# Patient Record
Sex: MALE | Race: OTHER | Employment: FULL TIME | URBAN - METROPOLITAN AREA
[De-identification: names, ages, dates, MRNs, and addresses within clinical notes are randomized per-mention and may not be internally consistent; named-entity substitution may affect disease eponyms.]

---

## 2022-05-01 ENCOUNTER — MED REC SCAN ONLY (OUTPATIENT)
Dept: ORTHOPEDICS CLINIC | Facility: CLINIC | Age: 32
End: 2022-05-01

## 2022-05-16 ENCOUNTER — HOSPITAL ENCOUNTER (OUTPATIENT)
Dept: MRI IMAGING | Age: 32
Discharge: HOME | End: 2022-05-16
Attending: ORTHOPAEDIC SURGERY
Payer: COMMERCIAL

## 2022-05-16 ENCOUNTER — TELEPHONE (OUTPATIENT)
Dept: ORTHOPEDICS CLINIC | Facility: CLINIC | Age: 32
End: 2022-05-16

## 2022-05-16 ENCOUNTER — OFFICE VISIT (OUTPATIENT)
Dept: ORTHOPEDICS CLINIC | Facility: CLINIC | Age: 32
End: 2022-05-16
Payer: COMMERCIAL

## 2022-05-16 DIAGNOSIS — S86.812A RUPTURE OF LEFT PATELLAR TENDON, INITIAL ENCOUNTER: Primary | ICD-10-CM

## 2022-05-16 DIAGNOSIS — S86.812A RUPTURE PATELLAR TENDON, LEFT, INITIAL ENCOUNTER: Primary | ICD-10-CM

## 2022-05-16 DIAGNOSIS — S86.812A RUPTURE OF LEFT PATELLAR TENDON, INITIAL ENCOUNTER: ICD-10-CM

## 2022-05-16 PROCEDURE — 99205 OFFICE O/P NEW HI 60 MIN: CPT | Performed by: ORTHOPAEDIC SURGERY

## 2022-05-16 PROCEDURE — 73721 MRI JNT OF LWR EXTRE W/O DYE: CPT | Performed by: ORTHOPAEDIC SURGERY

## 2022-05-16 RX ORDER — ZINC GLUCONATE 50 MG
TABLET ORAL AS DIRECTED
COMMUNITY

## 2022-05-16 RX ORDER — HYDROCODONE BITARTRATE AND ACETAMINOPHEN 5; 325 MG/1; MG/1
1 TABLET ORAL
COMMUNITY
Start: 2022-05-13

## 2022-05-16 NOTE — PROGRESS NOTES
OR BOOKING SHEET KNEE  Name: Bibi Merino  MRN: [id number]   : 1990  Diagnosis:  [x] Rupture of left patellar tendon, initial encounter [K18.708N]  Disposition:    [x] Ambulatory  [] Overnight for AGNES  [] Overnight for observation and pain control  [] Inpatient procedure    Operative Time Required: 2 hours  Procedure:  Antibiotics: 2 g cefazolin within 60 minutes of surgical incision (3 g if > 120 kg)  Laterality: [] RIGHT  [x] LEFT                       [] BILATERAL  Procedures:   [x] Arthroscopy  [] Arthrotomy (81795)  [] Arthroscopy/Arthrotomy     [x] Patella Tendon Repair (75612)     [x] Synovectomy, 2 or more compartments (24038)   [] Removal of hardware (51198)   [] I & D (51 [phone] 20 [phone])   [] NEW (26978)   [] Resection of infrapatellar branch of saphenous nerve (53919)                    [] PatelloFemoral Joint Replacement (54549)    Injections:   [x] Stem cells from bone marrow aspiration (13527)    From:  [x] Left Iliac crest  [] Right Iliac crest    To:  [x] Left knee   [] Right knee  [] Other     Additional info:   [] PCP Clearance Needed  [] C - arm  [x] TXA at Time of Surgery  [x] Physical Therapy External Referral  [x] DME Rx Needed  Implants needed: Arthrex  Positioning Equipment: Supine with Lateral Post   Assistant request: Dominique Méndez

## 2022-05-16 NOTE — TELEPHONE ENCOUNTER
Surgeon: Dr. Hernandez Board    Date of Surgery: 5/19/22     Post Op Appt: 5/27/22     Facility: [facility]    Inpatient or Outpatient: Outpatient    Surgical Assistant: yes    Preadmission Testing Ordered:  yes    Pre-Op Clearance Requested:   PCP: n/a   Cardiac: n/a   Pulmonary: n/a   Dental: n/a   Other: n/a    DME: yes    Rehab Services Ordered:   Home Health: n/a   Outpatient: yes    Is this work comp related? no    Prior Authorization: pending    Disability Paperwork: no    On [place] Calendar: yes    Notes:

## 2022-05-18 RX ORDER — ONDANSETRON 4 MG/1
4 TABLET, ORALLY DISINTEGRATING ORAL EVERY 8 HOURS PRN
Qty: 8 TABLET | Refills: 0 | Status: SHIPPED | OUTPATIENT
Start: 2022-05-18 | End: 2022-05-27

## 2022-05-18 RX ORDER — TRAMADOL HYDROCHLORIDE 50 MG/1
TABLET ORAL
Qty: 20 TABLET | Refills: 1 | Status: SHIPPED | OUTPATIENT
Start: 2022-05-18 | End: 2022-05-27

## 2022-05-19 ENCOUNTER — ANESTHESIA EVENT (OUTPATIENT)
Dept: SURGERY | Facility: HOSPITAL | Age: 32
End: 2022-05-19
Payer: COMMERCIAL

## 2022-05-19 ENCOUNTER — HOSPITAL ENCOUNTER (OUTPATIENT)
Facility: HOSPITAL | Age: 32
Setting detail: HOSPITAL OUTPATIENT SURGERY
Discharge: HOME | End: 2022-05-19
Attending: ORTHOPAEDIC SURGERY | Admitting: ORTHOPAEDIC SURGERY
Payer: COMMERCIAL

## 2022-05-19 ENCOUNTER — ANESTHESIA (OUTPATIENT)
Dept: SURGERY | Facility: HOSPITAL | Age: 32
End: 2022-05-19
Payer: COMMERCIAL

## 2022-05-19 VITALS
TEMPERATURE: 97 F | OXYGEN SATURATION: 100 % | HEART RATE: 93 BPM | HEIGHT: 74 IN | BODY MASS INDEX: 28.71 KG/M2 | DIASTOLIC BLOOD PRESSURE: 94 MMHG | RESPIRATION RATE: 12 BRPM | SYSTOLIC BLOOD PRESSURE: 159 MMHG | WEIGHT: 223.75 LBS

## 2022-05-19 DIAGNOSIS — S86.812A RUPTURE PATELLAR TENDON, LEFT, INITIAL ENCOUNTER: ICD-10-CM

## 2022-05-19 LAB — SARS-COV-2 RNA RESP QL NAA+PROBE: NOT DETECTED

## 2022-05-19 PROCEDURE — 0SBD4ZZ EXCISION OF LEFT KNEE JOINT, PERCUTANEOUS ENDOSCOPIC APPROACH: ICD-10-PCS | Performed by: ORTHOPAEDIC SURGERY

## 2022-05-19 PROCEDURE — 76942 ECHO GUIDE FOR BIOPSY: CPT | Performed by: ANESTHESIOLOGY

## 2022-05-19 RX ORDER — LIDOCAINE HYDROCHLORIDE 10 MG/ML
INJECTION, SOLUTION EPIDURAL; INFILTRATION; INTRACAUDAL; PERINEURAL AS NEEDED
Status: DISCONTINUED | OUTPATIENT
Start: 2022-05-19 | End: 2022-05-19 | Stop reason: SURG

## 2022-05-19 RX ORDER — CEFAZOLIN SODIUM/WATER 2 G/20 ML
2 SYRINGE (ML) INTRAVENOUS ONCE
Status: COMPLETED | OUTPATIENT
Start: 2022-05-19 | End: 2022-05-19

## 2022-05-19 RX ORDER — HYDROMORPHONE HYDROCHLORIDE 1 MG/ML
0.4 INJECTION, SOLUTION INTRAMUSCULAR; INTRAVENOUS; SUBCUTANEOUS EVERY 5 MIN PRN
Status: DISCONTINUED | OUTPATIENT
Start: 2022-05-19 | End: 2022-05-19

## 2022-05-19 RX ORDER — KETOROLAC TROMETHAMINE 30 MG/ML
INJECTION, SOLUTION INTRAMUSCULAR; INTRAVENOUS AS NEEDED
Status: DISCONTINUED | OUTPATIENT
Start: 2022-05-19 | End: 2022-05-19 | Stop reason: SURG

## 2022-05-19 RX ORDER — ACETAMINOPHEN 500 MG
1000 TABLET ORAL ONCE
Status: DISCONTINUED | OUTPATIENT
Start: 2022-05-19 | End: 2022-05-19 | Stop reason: HOSPADM

## 2022-05-19 RX ORDER — SODIUM CHLORIDE, SODIUM LACTATE, POTASSIUM CHLORIDE, CALCIUM CHLORIDE 600; 310; 30; 20 MG/100ML; MG/100ML; MG/100ML; MG/100ML
INJECTION, SOLUTION INTRAVENOUS CONTINUOUS
Status: DISCONTINUED | OUTPATIENT
Start: 2022-05-19 | End: 2022-05-19

## 2022-05-19 RX ORDER — HYDROMORPHONE HYDROCHLORIDE 1 MG/ML
0.2 INJECTION, SOLUTION INTRAMUSCULAR; INTRAVENOUS; SUBCUTANEOUS EVERY 5 MIN PRN
Status: DISCONTINUED | OUTPATIENT
Start: 2022-05-19 | End: 2022-05-19

## 2022-05-19 RX ORDER — MIDAZOLAM HYDROCHLORIDE 1 MG/ML
INJECTION INTRAMUSCULAR; INTRAVENOUS AS NEEDED
Status: DISCONTINUED | OUTPATIENT
Start: 2022-05-19 | End: 2022-05-19 | Stop reason: SURG

## 2022-05-19 RX ORDER — MEPERIDINE HYDROCHLORIDE 25 MG/ML
12.5 INJECTION INTRAMUSCULAR; INTRAVENOUS; SUBCUTANEOUS ONCE
Status: COMPLETED | OUTPATIENT
Start: 2022-05-19 | End: 2022-05-19

## 2022-05-19 RX ORDER — SCOLOPAMINE TRANSDERMAL SYSTEM 1 MG/1
1 PATCH, EXTENDED RELEASE TRANSDERMAL ONCE
Status: DISCONTINUED | OUTPATIENT
Start: 2022-05-19 | End: 2022-05-19 | Stop reason: HOSPADM

## 2022-05-19 RX ORDER — HYDROCODONE BITARTRATE AND ACETAMINOPHEN 5; 325 MG/1; MG/1
2 TABLET ORAL ONCE AS NEEDED
Status: COMPLETED | OUTPATIENT
Start: 2022-05-19 | End: 2022-05-19

## 2022-05-19 RX ORDER — ROPIVACAINE HYDROCHLORIDE 5 MG/ML
INJECTION, SOLUTION EPIDURAL; INFILTRATION; PERINEURAL AS NEEDED
Status: DISCONTINUED | OUTPATIENT
Start: 2022-05-19 | End: 2022-05-19 | Stop reason: SURG

## 2022-05-19 RX ORDER — TRANEXAMIC ACID 10 MG/ML
1000 INJECTION, SOLUTION INTRAVENOUS ONCE
Status: COMPLETED | OUTPATIENT
Start: 2022-05-19 | End: 2022-05-19

## 2022-05-19 RX ORDER — NALOXONE HYDROCHLORIDE 0.4 MG/ML
80 INJECTION, SOLUTION INTRAMUSCULAR; INTRAVENOUS; SUBCUTANEOUS AS NEEDED
Status: DISCONTINUED | OUTPATIENT
Start: 2022-05-19 | End: 2022-05-19

## 2022-05-19 RX ORDER — ACETAMINOPHEN 500 MG
1000 TABLET ORAL ONCE AS NEEDED
Status: COMPLETED | OUTPATIENT
Start: 2022-05-19 | End: 2022-05-19

## 2022-05-19 RX ORDER — ONDANSETRON 2 MG/ML
4 INJECTION INTRAMUSCULAR; INTRAVENOUS EVERY 6 HOURS PRN
Status: DISCONTINUED | OUTPATIENT
Start: 2022-05-19 | End: 2022-05-19

## 2022-05-19 RX ORDER — DEXAMETHASONE SODIUM PHOSPHATE 4 MG/ML
VIAL (ML) INJECTION AS NEEDED
Status: DISCONTINUED | OUTPATIENT
Start: 2022-05-19 | End: 2022-05-19 | Stop reason: SURG

## 2022-05-19 RX ORDER — HYDROMORPHONE HYDROCHLORIDE 1 MG/ML
0.6 INJECTION, SOLUTION INTRAMUSCULAR; INTRAVENOUS; SUBCUTANEOUS EVERY 5 MIN PRN
Status: DISCONTINUED | OUTPATIENT
Start: 2022-05-19 | End: 2022-05-19

## 2022-05-19 RX ORDER — MEPERIDINE HYDROCHLORIDE 25 MG/ML
INJECTION INTRAMUSCULAR; INTRAVENOUS; SUBCUTANEOUS
Status: COMPLETED
Start: 2022-05-19 | End: 2022-05-19

## 2022-05-19 RX ORDER — HYDROCODONE BITARTRATE AND ACETAMINOPHEN 5; 325 MG/1; MG/1
1 TABLET ORAL ONCE AS NEEDED
Status: COMPLETED | OUTPATIENT
Start: 2022-05-19 | End: 2022-05-19

## 2022-05-19 RX ORDER — BUPIVACAINE HYDROCHLORIDE 5 MG/ML
INJECTION, SOLUTION EPIDURAL; INTRACAUDAL AS NEEDED
Status: DISCONTINUED | OUTPATIENT
Start: 2022-05-19 | End: 2022-05-19 | Stop reason: HOSPADM

## 2022-05-19 RX ORDER — PROCHLORPERAZINE EDISYLATE 5 MG/ML
5 INJECTION INTRAMUSCULAR; INTRAVENOUS EVERY 8 HOURS PRN
Status: DISCONTINUED | OUTPATIENT
Start: 2022-05-19 | End: 2022-05-19

## 2022-05-19 RX ORDER — ONDANSETRON 2 MG/ML
INJECTION INTRAMUSCULAR; INTRAVENOUS AS NEEDED
Status: DISCONTINUED | OUTPATIENT
Start: 2022-05-19 | End: 2022-05-19 | Stop reason: SURG

## 2022-05-19 RX ADMIN — SODIUM CHLORIDE, SODIUM LACTATE, POTASSIUM CHLORIDE, CALCIUM CHLORIDE: 600; 310; 30; 20 INJECTION, SOLUTION INTRAVENOUS at 10:39:00

## 2022-05-19 RX ADMIN — MIDAZOLAM HYDROCHLORIDE 2 MG: 1 INJECTION INTRAMUSCULAR; INTRAVENOUS at 08:18:00

## 2022-05-19 RX ADMIN — LIDOCAINE HYDROCHLORIDE 50 MG: 10 INJECTION, SOLUTION EPIDURAL; INFILTRATION; INTRACAUDAL; PERINEURAL at 08:22:00

## 2022-05-19 RX ADMIN — ROPIVACAINE HYDROCHLORIDE 15 ML: 5 INJECTION, SOLUTION EPIDURAL; INFILTRATION; PERINEURAL at 08:27:00

## 2022-05-19 RX ADMIN — SODIUM CHLORIDE, SODIUM LACTATE, POTASSIUM CHLORIDE, CALCIUM CHLORIDE: 600; 310; 30; 20 INJECTION, SOLUTION INTRAVENOUS at 08:17:00

## 2022-05-19 RX ADMIN — CEFAZOLIN SODIUM/WATER 2 G: 2 G/20 ML SYRINGE (ML) INTRAVENOUS at 08:30:00

## 2022-05-19 RX ADMIN — SODIUM CHLORIDE, SODIUM LACTATE, POTASSIUM CHLORIDE, CALCIUM CHLORIDE: 600; 310; 30; 20 INJECTION, SOLUTION INTRAVENOUS at 10:02:00

## 2022-05-19 RX ADMIN — TRANEXAMIC ACID 1000 MG: 10 INJECTION, SOLUTION INTRAVENOUS at 08:30:00

## 2022-05-19 RX ADMIN — ONDANSETRON 4 MG: 2 INJECTION INTRAMUSCULAR; INTRAVENOUS at 08:40:00

## 2022-05-19 RX ADMIN — DEXAMETHASONE SODIUM PHOSPHATE 4 MG: 4 MG/ML VIAL (ML) INJECTION at 08:40:00

## 2022-05-19 RX ADMIN — KETOROLAC TROMETHAMINE 30 MG: 30 INJECTION, SOLUTION INTRAMUSCULAR; INTRAVENOUS at 10:27:00

## 2022-05-19 NOTE — ANESTHESIA POSTPROCEDURE EVALUATION
[facility] Patient Status:  Hospital Outpatient Surgery   Age/Gender 32year old male MRN [id number]   [facility] SURGERY Attending Veto Miguelito, [address] PCP Cole Shook DO       Anesthesia Post-op Note    LEFT KNEE ARTHROSCOPY, PATELLA TENDON REPAIR ,SYNOVECTOMY OF 2 OR MORE COMPARTMENTS, STEM CELL FROM BONE MARROW ASPIRATION FROM LEFT ILIAC CREST TO LEFT KNEE. Procedure Summary     Date: 05/19/22 Room / Location: [facility] OR 17 / [facility] OR    Anesthesia Start: 2995 Anesthesia Stop: 6674    Procedures:       LEFT KNEE ARTHROSCOPY, PATELLA TENDON REPAIR ,SYNOVECTOMY OF 2 OR MORE COMPARTMENTS, STEM CELL FROM BONE MARROW ASPIRATION FROM LEFT ILIAC CREST TO LEFT KNEE. (Left Knee)      . (Left Knee) Diagnosis:       Rupture patellar tendon, left, initial encounter      (Rupture patellar tendon, left, initial encounter [R56.073J])    Surgeons: Veto Farley MD Anesthesiologist: Daquan Bloom DO    Anesthesia Type: general ASA Status: 1          Anesthesia Type: general    Vitals Value Taken Time   /98 05/19/22 1039   Temp 98.0 05/19/22 1039   Pulse 94 05/19/22 1039   Resp 18 05/19/22 1039   SpO2 100 05/19/22 1039       Patient Location: PACU    Anesthesia Type: general    Airway Patency: patent and extubated    Postop Pain Control: adequate    Mental Status: preanesthetic baseline    Nausea/Vomiting: none    Cardiopulmonary/Hydration status: stable euvolemic    Complications: no apparent anesthesia related complications    Postop vital signs: stable    Dental Exam: Unchanged from Preop    Patient to be discharged from PACU when criteria met.

## 2022-05-19 NOTE — ANESTHESIA PROCEDURE NOTES
Airway  Date/Time: 5/19/2022 8:24 AM  Urgency: elective    Airway not difficult    General Information and Staff    Patient location during procedure: OR  Anesthesiologist: Peyton Mclean DO  Performed: anesthesiologist     Indications and Patient Condition  Indications for airway management: anesthesia  Sedation level: deep  Preoxygenated: yes  Patient position: sniffing  Mask difficulty assessment: 1 - vent by mask    Final Airway Details  Final airway type: supraglottic airway      Successful airway: classic  Size 4      Number of attempts at approach: 1  Number of other approaches attempted: 0

## 2022-05-19 NOTE — BRIEF OP NOTE
Pre-Operative Diagnosis: Rupture patellar tendon, left, initial encounter [E26.815T]     Post-Operative Diagnosis: Rupture patellar tendon, left, initial encounter [C70.246E]      Procedure Performed:   LEFT KNEE ARTHROSCOPY, PATELLA TENDON REPAIR ,SYNOVECTOMY OF 2 OR MORE COMPARTMENTS, STEM CELL FROM BONE MARROW ASPIRATION FROM LEFT ILIAC CREST TO LEFT KNEE. Surgeon(s) and Role:     * Doron Perez MD - Primary    Assistant(s):  PA: LAURA Mendoza     Surgical Findings: The patient was placed supine, and on arthroscopic examination there was evidence of recent trauma attributed to the patellar tendon repair. A medial patellar chondroplasty was performed, as well as a synovectomy. The medial and lateral meniscus were intact without evidence of pathology. Following arthroscopy a mini-open incision was made along the patellar tendon. The patellar retinaculum was carefully dissected, and there was evidence of complete disruption of the patellar tendon, with hemorraghic tissue planes. This was then debrided while preserving healthy patellar tendon tissue. The inferior aspect of the patella was carefully prepared with an acorn joann and prepared for anchor placement. The patellar tendon was prepared with sutures and two swivel locks were placed at the patella with traction, restoring native patellar tendon anatomy. Sutures were then used to complete augmentation to the quadriceps tendon. BMAC was applied, and wounds were closed in standard fashion achieving hemostasis. The patient was placed in a postoperative hinged knee brace.       Estimated Blood Loss: Blood Output: 50 mL (5/19/2022  9:55 AM)      Sherryle Gross, PA  5/19/2022  10:38 AM

## 2022-05-19 NOTE — BRIEF OP NOTE
Pre-Operative Diagnosis: Rupture patellar tendon, left, initial encounter [W00.854M]     Post-Operative Diagnosis: Rupture patellar tendon, left, initial encounter [F81.147D]      Procedure Performed:   LEFT KNEE ARTHROSCOPY, PATELLA TENDON REPAIR ,SYNOVECTOMY OF 2 OR MORE COMPARTMENTS, STEM CELL FROM BONE MARROW ASPIRATION FROM LEFT ILIAC CREST TO LEFT KNEE. Surgeon(s) and Role:     * Suellyn Osler, MD - Primary    Assistant(s):  PA: LAURA Louis     Surgical Findings: Full thickness rupture of patellar tendon from infrapatellar pole with retinacular tear. Small defect along the medial patellar facet, 5x5 mm. Extensive synovitis, managed with synovectomy and medial patellar chondroplasty. Excellent repair of patellar tendon with the aid of suture anchors x 2 (4.75 mm swivelock).       Specimen: None     Estimated Blood Loss: Blood Output: 50 mL (5/19/2022  9:55 AM)      Dictation Number:  Carie George MD  5/19/2022  10:07 AM

## 2022-05-19 NOTE — ANESTHESIA PROCEDURE NOTES
[facility]  Performed by: Cody Bourne DO  Authorized by: Cody Bourne DO       General Information and Staff    Start Time:  5/19/2022 8:25 AM  End Time:  5/19/2022 8:27 AM  Anesthesiologist:  Cody Bourne DO  Performed by: Anesthesiologist  Patient Location:  OR      Site Identification: real time ultrasound guided and image stored and retrievable    Block site/laterality marked before start: site marked  Reason for Block: at surgeon's request and post-op pain management    Preanesthetic Checklist: 2 patient identifers, IV checked, risks and benefits discussed, monitors and equipment checked, pre-op evaluation, timeout performed, anesthesia consent, sterile technique used, no prohibitive neurological deficits and no local skin infection at insertion site      Procedure Details    Patient Position:  Supine  Prep: ChloraPrep    Monitoring:  Cardiac monitor, continuous pulse ox and blood pressure cuff  Block Type: Adductor canal  Laterality:  Left  Injection Technique:  Single-shot    Needle    Needle Type:  Short-bevel and echogenic  Needle Gauge:  21 G  Needle Length:  100 mm  Needle Localization:  Ultrasound guidance  Reason for Ultrasound Use: appropriate spread of the medication was noted in real time and no ultrasound evidence of intravascular and/or intraneural injection            Assessment    Injection Assessment:  Good spread noted, negative resistance, negative aspiration for heme, incremental injection and low pressure  Heart Rate Change: No    - Patient tolerated block procedure well without evidence of immediate block related complications.      Medications      Additional Comments    Medication:  Ropivacaine 0.5% 15mL

## 2022-05-24 ENCOUNTER — TELEPHONE (OUTPATIENT)
Dept: ORTHOPEDICS CLINIC | Facility: CLINIC | Age: 32
End: 2022-05-24

## 2022-05-24 NOTE — TELEPHONE ENCOUNTER
Received FMLA forms via [employer]. Please collect $25 form fee. I already sent patient the Medical Records Release form. Thanks!

## 2022-05-27 ENCOUNTER — OFFICE VISIT (OUTPATIENT)
Dept: ORTHOPEDICS CLINIC | Facility: CLINIC | Age: 32
End: 2022-05-27
Payer: COMMERCIAL

## 2022-05-27 DIAGNOSIS — S86.812D PATELLAR TENDON RUPTURE, LEFT, SUBSEQUENT ENCOUNTER: Primary | ICD-10-CM

## 2022-05-27 PROCEDURE — 99024 POSTOP FOLLOW-UP VISIT: CPT | Performed by: ORTHOPAEDIC SURGERY

## 2022-06-01 ENCOUNTER — MED REC SCAN ONLY (OUTPATIENT)
Dept: ORTHOPEDICS CLINIC | Facility: CLINIC | Age: 32
End: 2022-06-01

## 2022-06-02 NOTE — TELEPHONE ENCOUNTER
Patient is requesting a call back from [facility] in regards to [facility] paperwork, states there are two questions that need to be answered on the form.

## 2022-06-02 NOTE — TELEPHONE ENCOUNTER
Received yet another 10 pages fax from [facility] regarding patient's Attending Physician Statement, forwarded to Davina or Vania for completion.

## 2022-06-07 ENCOUNTER — TELEPHONE (OUTPATIENT)
Dept: ORTHOPEDICS CLINIC | Facility: CLINIC | Age: 32
End: 2022-06-07

## 2022-06-07 NOTE — TELEPHONE ENCOUNTER
Patient is calling regarding his FMLA forms that are uncompleted. He states that question 6 & 7 have yet to be filled out. Once completed, please send back through my-chart. Patient also wants to know if our office has received some documents via fax from [facility]. Please advise.

## 2022-06-22 ENCOUNTER — OFFICE VISIT (OUTPATIENT)
Dept: ORTHOPEDICS CLINIC | Facility: CLINIC | Age: 32
End: 2022-06-22
Payer: COMMERCIAL

## 2022-06-22 DIAGNOSIS — S86.812D PATELLAR TENDON RUPTURE, LEFT, SUBSEQUENT ENCOUNTER: Primary | ICD-10-CM

## 2022-06-22 PROCEDURE — 99024 POSTOP FOLLOW-UP VISIT: CPT | Performed by: ORTHOPAEDIC SURGERY

## 2022-08-08 ENCOUNTER — MED REC SCAN ONLY (OUTPATIENT)
Dept: ORTHOPEDICS CLINIC | Facility: CLINIC | Age: 32
End: 2022-08-08

## 2022-08-22 ENCOUNTER — OFFICE VISIT (OUTPATIENT)
Dept: ORTHOPEDICS CLINIC | Facility: CLINIC | Age: 32
End: 2022-08-22
Payer: COMMERCIAL

## 2022-08-22 DIAGNOSIS — S86.812D PATELLAR TENDON RUPTURE, LEFT, SUBSEQUENT ENCOUNTER: Primary | ICD-10-CM

## 2022-08-22 PROCEDURE — 99213 OFFICE O/P EST LOW 20 MIN: CPT | Performed by: ORTHOPAEDIC SURGERY

## 2022-08-30 ENCOUNTER — PATIENT MESSAGE (OUTPATIENT)
Dept: ORTHOPEDICS CLINIC | Facility: CLINIC | Age: 32
End: 2022-08-30

## 2022-09-01 ENCOUNTER — TELEPHONE (OUTPATIENT)
Dept: ORTHOPEDICS CLINIC | Facility: CLINIC | Age: 32
End: 2022-09-01

## 2022-09-01 NOTE — TELEPHONE ENCOUNTER
The [place] paperwork received on 9/1/22. Sent to [facility] April via email. Sent patient my chart regarding MALATHI & $25 fee.

## 2022-10-21 ENCOUNTER — OFFICE VISIT (OUTPATIENT)
Dept: ORTHOPEDICS CLINIC | Facility: CLINIC | Age: 32
End: 2022-10-21
Payer: COMMERCIAL

## 2022-10-21 DIAGNOSIS — S86.812D PATELLAR TENDON RUPTURE, LEFT, SUBSEQUENT ENCOUNTER: Primary | ICD-10-CM

## 2022-10-21 PROCEDURE — 99213 OFFICE O/P EST LOW 20 MIN: CPT | Performed by: ORTHOPAEDIC SURGERY

## 2022-11-23 ENCOUNTER — MED REC SCAN ONLY (OUTPATIENT)
Dept: ORTHOPEDICS CLINIC | Facility: CLINIC | Age: 32
End: 2022-11-23

## 2024-06-20 ENCOUNTER — TELEPHONE (OUTPATIENT)
Dept: ORTHOPEDICS CLINIC | Facility: CLINIC | Age: 34
End: 2024-06-20

## 2024-06-20 DIAGNOSIS — M25.561 ACUTE PAIN OF RIGHT KNEE: Primary | ICD-10-CM

## 2024-06-20 NOTE — TELEPHONE ENCOUNTER
Patient called - He injured his right knee at work on Monday 6/17 and was advised by his supervisor to see Ortho. He has seen Dr. Sullivan previously.    Scheduled with Sincer for 6/26 at 7:50 am at [place].    Please order xrays if appropriate. Patient will arrive 20-30 min early.

## 2024-06-26 ENCOUNTER — HOSPITAL ENCOUNTER (OUTPATIENT)
Dept: GENERAL RADIOLOGY | Age: 34
Discharge: HOME | End: 2024-06-26
Attending: PHYSICIAN ASSISTANT

## 2024-06-26 ENCOUNTER — OFFICE VISIT (OUTPATIENT)
Dept: ORTHOPEDICS CLINIC | Facility: CLINIC | Age: 34
End: 2024-06-26

## 2024-06-26 VITALS — BODY MASS INDEX: 27.59 KG/M2 | HEIGHT: 74 IN | WEIGHT: 215 LBS

## 2024-06-26 DIAGNOSIS — S83.206D POSITIVE MCMURRAY TEST OF RIGHT KNEE, SUBSEQUENT ENCOUNTER: Primary | ICD-10-CM

## 2024-06-26 DIAGNOSIS — M25.561 ACUTE PAIN OF RIGHT KNEE: ICD-10-CM

## 2024-06-26 PROCEDURE — 73564 X-RAY EXAM KNEE 4 OR MORE: CPT | Performed by: PHYSICIAN ASSISTANT

## 2024-06-26 NOTE — H&P
[facility] - ORTHOPEDICS  [address] [place] [id number]  [phone]     NEW PATIENT VISIT - HISTORY AND PHYSICAL EXAMINATION     Name: Harvey Coburn   MRN: [id number]  Date: 6/26/2024     CC: Right knee pain.     REFERRED BY: Mar Will DO    HPI:   Harvey Coburn is a very pleasant 33 year old male who presents today for evaluation, consultation, and management of right knee work injury that occurred on June 17, 2024.  He works for [employer] as a .  He was moving a package and felt a pop in his right knee while pushing.  He reported the injury and presents today for evaluation.  He rates his pain to be a 2 out of 10 and notes less than 50% normal function.  He is otherwise very active enjoys running and swimming.    He also has a history of a left knee patellar tendon repair performed on May 19, 2022.      PMH:   Past Medical History:    History of surgical removal of lesion       PAST SURGICAL HX:  Past Surgical History:   Procedure Laterality Date    Other surgical history Left 05/19/2022    LT PATELLA TENDON REPAIR W/ MD GE       FAMILY HX:  Family History   Problem Relation Age of Onset    Diabetes Father     Diabetes Mother     Diabetes Maternal Grandmother     Diabetes Maternal Grandfather        ALLERGIES:  Patient has no known allergies.    MEDICATIONS:   Current Outpatient Medications   Medication Sig Dispense Refill    B Complex Vitamins (VITAMIN B COMPLEX OR) Take by mouth As Directed.      Magnesium-Potassium 250-100 MG Oral Tab Take by mouth As Directed.      CALCIUM CARBONATE-VITAMIN D OR Take by mouth As Directed.      ASCORBIC ACID OR Take by mouth As Directed.      Zinc Gluconate 50 MG Oral Tab Take by mouth As Directed.         ROS: A comprehensive 14 point review of systems was performed and was negative aside from the aforementioned per history of present illness.    SOCIAL HX:  Social History     Occupational History    Not on file    Tobacco Use    Smoking status: Smoker, Current Status Unknown     Current packs/day: 0.50     Average packs/day: 0.5 packs/day for 8.0 years (4.0 ttl pk-yrs)     Types: Cigarettes    Smokeless tobacco: Never    Tobacco comments:     USING VAPE ONLY    Vaping Use    Vaping status: Some Days    Substances: Nicotine, THC    Devices: Pre-filled or refillable cartridge   Substance and Sexual Activity    Alcohol use: Yes     Alcohol/week: 2.0 - 3.0 standard drinks of alcohol     Types: 2 - 3 Cans of beer per week     Comment: social    Drug use: Yes     Types: Cannabis     Comment: monthly    Sexual activity: Yes       PE:   Vitals:    06/26/24 0745   Weight: 215 lb (97.5 kg)   Height: 6' 2\" (1.88 m)     Estimated body mass index is 27.6 kg/m² as calculated from the following:    Height as of this encounter: 6' 2\" (1.88 m).    Weight as of this encounter: 215 lb (97.5 kg).    Physical Exam  Constitutional:       Appearance: Normal appearance.   HENT:      Head: Normocephalic and atraumatic.   Eyes:      Extraocular Movements: Extraocular movements intact.   Neck:      Musculoskeletal: Normal range of motion and neck supple.   Cardiovascular:      Pulses: Normal pulses.   Pulmonary:      Effort: Pulmonary effort is normal. No respiratory distress.   Abdominal:      General: There is no distension.   Skin:     General: Skin is warm.      Capillary Refill: Capillary refill takes less than 2 seconds.      Findings: No bruising.   Neurological:      General: No focal deficit present.      Mental Status: Alert.   Psychiatric:         Mood and Affect: Mood normal.     Examination of the right knee demonstrates:     Skin is intact, warm and dry.   Atrophy: none    Effusion: small    Joint line tenderness: none  Crepitation: none   Pamela: Trace   Patellar mobility: normal without apprehension  J-sign: none    ROM: Extension full  Flexion 140 degrees  ACL:  Negative Lachman, Negative Pivot Shift   PCL:  Negative Posterior  Drawer  Collateral Ligaments: Stable to Varus and Valgus stress at 0 and 30 degrees  Strength: normal   Hip joint: normal pain-free ROM   Gait:  mildly antalgic   Leg length: equal and symmetric  Alignment:  neutral     No obvious peripheral edema noted.   Distal neurovascular exam demonstrates normal perfusion, intact sensation to light touch and full strength.     Examination of the contralateral knee demonstrates:  No significant atrophy, swelling or effusion. Full range of motion. Neurovascularly intact distally.    Radiographic Examination/Diagnostics:  I personally viewed, independently interpreted and radiology report was reviewed.    X-rays right knee, June 26, 2024 demonstrates evidence of fracture foreign body or soft tissue injury.    IMPRESSION: Harvey Coburn is a 33 year old male who presents with right knee injury concerning for meniscal pathology.    PLAN:   We had a detailed discussion outlining the etiology, anatomy, pathophysiology, and natural history of the patient's findings. Imaging was reviewed in detail and correlated to a 3-dimensional model of the patient's pathology.     In light of the acute traumatic incident, loss of normal function, and  failure to progress conservatively we recommend an MRI to evaluate the integrity of the patient's right knee meniscus. The patient will follow up after imaging.   Differential diagnosis includes but not limited to: rotator cuff/labral pathology, impingement, tendinopathy, cartilage injury/loose body, bone marrow edema, and osteoarthritis.     External records were also reviewed for pertinent historical findings contributing to the patients undiagnosed new problem with uncertain prognosis.     The patient had the opportunity to ask questions, and all questions were answered appropriately.     FOLLOW-UP:  Return to clinic following completion of MRI to review scan and findings.             Nora Walls, BURTON, PA-C Orthopedic Surgery / Sports  Medicine Specialist  [facility]  [address] [place] [address]   [email or web address]  [email or web address]  t: [phone]  o: [phone]  f: [phone]    This note was dictated using Dragon software.  While it was briefly proofread prior to completion, some grammatical, spelling, and word choice errors due to dictation may still occur.

## (undated) NOTE — LETTER
Date: 10/21/2022    Patient Name: Zaire Lopez          To Whom it may concern: This letter has been written at the patient's request. The above patient was seen at the [facility] for treatment of a medical condition. The patient may return to work on 11/07/2022 without restrictions. Sincerely,      Jeremias Bray. Marion Dahl MD  Knee, Shoulder, & Elbow Surgery / Sports Medicine Specialist  [facility]  [place] [address], [address] [place] [address]. [email or web address]  t: [phone]  o: [phone]  f: [phone]      ZTYUMIKOT [email or web address] MD FABRICE

## (undated) NOTE — LETTER
Date: 8/22/2022    Patient Name: Juan Talavera          To Whom it may concern: This letter has been written at the patient's request. The above patient was seen at one of the [facility] locations for treatment of a medical condition. The patient may return to work for [employer]. No lifting more than 10 lbs. Sincerely,        Patrice Briceño. Riley Bowen MD  Knee, Shoulder, & Elbow Surgery / Sports Medicine Specialist  [facility]  [place] [address], [facility] , [facility]. [email or web address]  t: [phone]  o: [phone]  f: [phone]

## (undated) NOTE — LETTER
Date: 6/26/2024    Patient Name: Harvey Coburn          To Whom it may concern:    This letter has been written at the patient's request. The above patient was seen at [facility] for treatment of a medical condition.    The patient can continue to work full duty.         Sincerely,          Michaelr ERIKA Walls [facility], PA-C Orthopedic Surgery / Sports Medicine Specialist  [facility]  [address] [place] [address].[facility]  [email or web address]  t: [phone]  o: [phone]  f: [phone]    This note was dictated using Dragon software.  While it was briefly proofread prior to completion, some grammatical, spelling, and word choice errors due to dictation may still occur.

## (undated) NOTE — LETTER
WORK STATUS WORKSHEET    Date & Time: 5/19/2022, 7:22 AM  Patient: Nonda Grate  Encounter Provider(s): Chaka Mccoy MD     Diagnosis:    1. Rupture patellar tendon, left, initial encounter        The patient should remain off work until further notice. He is not permitted to drive for two weeks. Fanny Severance, MMS, PA-C Orthopedic Surgery / Sports Medicine Specialist  [facility]  [address]   Isaiah Wilkins. [facility] [place]  [email or web address]  t: [phone]  o: [phone]  f: [phone]          This note was dictated using Dragon software. While it was briefly proofread prior to completion, some grammatical, spelling, and word choice errors due to dictation may still occur.

## (undated) NOTE — LETTER
Date: 5/27/2022    Patient Name: Kalina Bearden          To Whom it may concern: This letter has been written at the patient's request. The above patient was seen at one of the [facility] locations for treatment of a medical condition. The patient may be excused from work due to severe left knee injury and patellar tendon rupture. Anticipated return to work is 6 months from date of surgery of 5/19/2022. Sincerely,        Adair Weems [address] [place] [address], MD  Knee, Shoulder, & Elbow Surgery / Sports Medicine Specialist  [facility]  [address]. [email or web address]  t: [phone]  o: [phone]  f: [phone]